# Patient Record
Sex: MALE | Race: BLACK OR AFRICAN AMERICAN | NOT HISPANIC OR LATINO | URBAN - METROPOLITAN AREA
[De-identification: names, ages, dates, MRNs, and addresses within clinical notes are randomized per-mention and may not be internally consistent; named-entity substitution may affect disease eponyms.]

---

## 2019-11-21 ENCOUNTER — HISTORICAL (OUTPATIENT)
Dept: LAB | Facility: HOSPITAL | Age: 39
End: 2019-11-21

## 2020-12-08 ENCOUNTER — HISTORICAL (OUTPATIENT)
Dept: ADMINISTRATIVE | Facility: HOSPITAL | Age: 40
End: 2020-12-08

## 2020-12-08 LAB
ABS NEUT (OLG): 3.1 X10(3)/MCL (ref 2.1–9.2)
ALBUMIN SERPL-MCNC: 4.4 GM/DL (ref 3.4–5)
ALBUMIN/GLOB SERPL: 1.47 {RATIO} (ref 1.5–2.5)
ALP SERPL-CCNC: 80 UNIT/L (ref 38–126)
ALT SERPL-CCNC: 20 UNIT/L (ref 7–52)
APPEARANCE, UA: CLEAR
AST SERPL-CCNC: 22 UNIT/L (ref 15–37)
BACTERIA #/AREA URNS AUTO: ABNORMAL /HPF
BILIRUB SERPL-MCNC: 1.1 MG/DL (ref 0.2–1)
BILIRUB UR QL STRIP: NEGATIVE MG/DL
BILIRUBIN DIRECT+TOT PNL SERPL-MCNC: 0.2 MG/DL (ref 0–0.5)
BILIRUBIN DIRECT+TOT PNL SERPL-MCNC: 0.9 MG/DL
BUN SERPL-MCNC: 6 MG/DL (ref 7–18)
CALCIUM SERPL-MCNC: 9.1 MG/DL (ref 8.5–10.1)
CHLORIDE SERPL-SCNC: 105 MMOL/L (ref 98–107)
CHOLEST SERPL-MCNC: 113 MG/DL (ref 0–200)
CHOLEST/HDLC SERPL: 3 {RATIO}
CO2 SERPL-SCNC: 27 MMOL/L (ref 21–32)
COLOR UR: YELLOW
CREAT SERPL-MCNC: 0.75 MG/DL (ref 0.6–1.3)
ERYTHROCYTE [DISTWIDTH] IN BLOOD BY AUTOMATED COUNT: 12.6 % (ref 11.5–17)
GLOBULIN SER-MCNC: 3 GM/DL (ref 1.2–3)
GLUCOSE (UA): NEGATIVE MG/DL
GLUCOSE SERPL-MCNC: 129 MG/DL (ref 74–106)
HCT VFR BLD AUTO: 42.8 % (ref 42–52)
HDLC SERPL-MCNC: 38 MG/DL (ref 35–60)
HGB BLD-MCNC: 14.3 GM/DL (ref 14–18)
HGB UR QL STRIP: ABNORMAL UNIT/L
KETONES UR QL STRIP: NEGATIVE MG/DL
LDLC SERPL CALC-MCNC: 74 MG/DL (ref 0–129)
LEUKOCYTE ESTERASE UR QL STRIP: NEGATIVE UNIT/L
LYMPHOCYTES # BLD AUTO: 2.3 X10(3)/MCL (ref 0.6–3.4)
LYMPHOCYTES NFR BLD AUTO: 39 % (ref 13–40)
MCH RBC QN AUTO: 29.1 PG (ref 27–31.2)
MCHC RBC AUTO-ENTMCNC: 33 GM/DL (ref 32–36)
MCV RBC AUTO: 87 FL (ref 80–94)
MONOCYTES # BLD AUTO: 0.5 X10(3)/MCL (ref 0.1–1.3)
MONOCYTES NFR BLD AUTO: 9.2 % (ref 0.1–24)
NEUTROPHILS NFR BLD AUTO: 51.8 % (ref 47–80)
NITRITE UR QL STRIP.AUTO: NEGATIVE
PH UR STRIP: 6 [PH]
PLATELET # BLD AUTO: 258 X10(3)/MCL (ref 130–400)
PMV BLD AUTO: 9.5 FL (ref 9.4–12.4)
POTASSIUM SERPL-SCNC: 3.8 MMOL/L (ref 3.5–5.1)
PROT SERPL-MCNC: 7.4 GM/DL (ref 6.4–8.2)
PROT UR QL STRIP: NEGATIVE MG/DL
RBC # BLD AUTO: 4.92 X10(6)/MCL (ref 4.7–6.1)
RBC #/AREA URNS HPF: ABNORMAL /HPF
SODIUM SERPL-SCNC: 140 MMOL/L (ref 136–145)
SP GR UR STRIP: 1.02
SQUAMOUS EPITHELIAL, UA: ABNORMAL /LPF
TRIGL SERPL-MCNC: 73 MG/DL (ref 30–150)
UROBILINOGEN UR STRIP-ACNC: 0.2 MG/DL
VLDLC SERPL CALC-MCNC: 14.6 MG/DL
WBC # SPEC AUTO: 5.9 X10(3)/MCL (ref 4.5–11.5)
WBC #/AREA URNS AUTO: ABNORMAL /[HPF]

## 2020-12-09 LAB
EST. AVERAGE GLUCOSE BLD GHB EST-MCNC: 134 MG/DL
HBA1C MFR BLD: 6.3 % (ref 4.4–6.4)

## 2021-03-15 ENCOUNTER — HISTORICAL (OUTPATIENT)
Dept: ADMINISTRATIVE | Facility: HOSPITAL | Age: 41
End: 2021-03-15

## 2021-03-15 LAB
EST. AVERAGE GLUCOSE BLD GHB EST-MCNC: 137 MG/DL
HBA1C MFR BLD: 6.4 % (ref 4.4–6.4)

## 2021-12-14 ENCOUNTER — HISTORICAL (OUTPATIENT)
Dept: ADMINISTRATIVE | Facility: HOSPITAL | Age: 41
End: 2021-12-14

## 2021-12-14 LAB
ABS NEUT (OLG): 3.7 X10(3)/MCL (ref 2.1–9.2)
ALBUMIN SERPL-MCNC: 4.6 GM/DL (ref 3.4–5)
ALBUMIN/GLOB SERPL: 1.64 {RATIO} (ref 1.5–2.5)
ALP SERPL-CCNC: 70 UNIT/L (ref 38–126)
ALT SERPL-CCNC: 31 UNIT/L (ref 7–52)
APPEARANCE, UA: CLEAR
AST SERPL-CCNC: 21 UNIT/L (ref 15–37)
BACTERIA #/AREA URNS AUTO: ABNORMAL /HPF
BILIRUB SERPL-MCNC: 1.2 MG/DL (ref 0.2–1)
BILIRUB UR QL STRIP: NEGATIVE MG/DL
BILIRUBIN DIRECT+TOT PNL SERPL-MCNC: 0.3 MG/DL (ref 0–0.5)
BILIRUBIN DIRECT+TOT PNL SERPL-MCNC: 0.9 MG/DL
BUN SERPL-MCNC: 9 MG/DL (ref 7–18)
CALCIUM SERPL-MCNC: 9.6 MG/DL (ref 8.5–10.1)
CHLORIDE SERPL-SCNC: 102 MMOL/L (ref 98–107)
CHOLEST SERPL-MCNC: 125 MG/DL (ref 0–200)
CHOLEST/HDLC SERPL: 3.3 {RATIO}
CO2 SERPL-SCNC: 29 MMOL/L (ref 21–32)
COLOR UR: YELLOW
CREAT SERPL-MCNC: 0.83 MG/DL (ref 0.6–1.3)
ERYTHROCYTE [DISTWIDTH] IN BLOOD BY AUTOMATED COUNT: 13.4 % (ref 11.5–17)
EST CREAT CLEARANCE SER (OHS): 165.29 ML/MIN
EST. AVERAGE GLUCOSE BLD GHB EST-MCNC: 143 MG/DL
GLOBULIN SER-MCNC: 2.8 GM/DL (ref 1.2–3)
GLUCOSE (UA): NEGATIVE MG/DL
GLUCOSE SERPL-MCNC: 157 MG/DL (ref 74–106)
HBA1C MFR BLD: 6.6 % (ref 4.4–6.4)
HCT VFR BLD AUTO: 44.8 % (ref 42–52)
HDLC SERPL-MCNC: 38 MG/DL (ref 35–60)
HGB BLD-MCNC: 15 GM/DL (ref 14–18)
HGB UR QL STRIP: ABNORMAL UNIT/L
KETONES UR QL STRIP: NEGATIVE MG/DL
LDLC SERPL CALC-MCNC: 70 MG/DL (ref 0–129)
LEUKOCYTE ESTERASE UR QL STRIP: NEGATIVE UNIT/L
LYMPHOCYTES # BLD AUTO: 1.9 X10(3)/MCL (ref 0.6–3.4)
LYMPHOCYTES NFR BLD AUTO: 30.5 % (ref 13–40)
MCH RBC QN AUTO: 28.3 PG (ref 27–31.2)
MCHC RBC AUTO-ENTMCNC: 34 GM/DL (ref 32–36)
MCV RBC AUTO: 84 FL (ref 80–94)
MONOCYTES # BLD AUTO: 0.7 X10(3)/MCL (ref 0.1–1.3)
MONOCYTES NFR BLD AUTO: 11.4 % (ref 0.1–24)
NEUTROPHILS NFR BLD AUTO: 58.1 % (ref 47–80)
NITRITE UR QL STRIP.AUTO: NEGATIVE
PH UR STRIP: 6 [PH]
PLATELET # BLD AUTO: 284 X10(3)/MCL (ref 130–400)
PMV BLD AUTO: 9.2 FL (ref 9.4–12.4)
POTASSIUM SERPL-SCNC: 3.4 MMOL/L (ref 3.5–5.1)
PROT SERPL-MCNC: 7.4 GM/DL (ref 6.4–8.2)
PROT UR QL STRIP: NEGATIVE MG/DL
RBC # BLD AUTO: 5.3 X10(6)/MCL (ref 4.7–6.1)
RBC #/AREA URNS HPF: ABNORMAL /HPF
SODIUM SERPL-SCNC: 141 MMOL/L (ref 136–145)
SP GR UR STRIP: 1.02
SQUAMOUS EPITHELIAL, UA: ABNORMAL /LPF
TRIGL SERPL-MCNC: 115 MG/DL (ref 30–150)
UROBILINOGEN UR STRIP-ACNC: 0.2 MG/DL
VLDLC SERPL CALC-MCNC: 23 MG/DL
WBC # SPEC AUTO: 6.3 X10(3)/MCL (ref 4.5–11.5)
WBC #/AREA URNS AUTO: ABNORMAL /[HPF]

## 2022-04-07 ENCOUNTER — HISTORICAL (OUTPATIENT)
Dept: ADMINISTRATIVE | Facility: HOSPITAL | Age: 42
End: 2022-04-07

## 2022-04-24 VITALS
OXYGEN SATURATION: 97 % | WEIGHT: 215.63 LBS | SYSTOLIC BLOOD PRESSURE: 120 MMHG | BODY MASS INDEX: 30.87 KG/M2 | HEIGHT: 70 IN | DIASTOLIC BLOOD PRESSURE: 78 MMHG

## 2022-05-01 NOTE — HISTORICAL [facility]
This is a historical note converted from [facility]. Formatting and pictures may have been removed.  Please reference [facility] for original formatting and attached multimedia. Chief Complaint  Annual wellness physical- NPO  History of Present Illness  Pt presents for Wellness exam.  He has been feeling okay.  He sleeps pretty good.  Appetite is normal.   at [facility].  He has alcohol once to twice a week.  He does not smoke.  He has an occasional soft drink.  He is physically active at work.  He is single.  Review of Systems  Constitutional:?no?weight gain,?no?weight loss,?no?fatigue, ?no?fever, ?no?chills, ?no?weakness, ?no?trouble sleeping  Eyes: ?no?vision loss/changes,?+ contacts and glasses,??no?pain,??no?redness,??no?blurry or double vision,??no?flashing lights,??no?glaucoma,??no?cataracts  Last eye exam:? about 1 year  Neck: ?no?lymphadenopathy,??no?thyroid abnormalities,??no?bruits,??no?stiffness  Ears:?no?decreased hearing,??no?tinnitus,??no?earache,??no?drainage?  Nose:??no congestion,??no?rhinorrhea,??no?epistaxis,??no?sinus pressure  Throat/Oral:?no?sore throat,??no?hoarseness, ?no?dental caries,??no?gum bleeding,??no?oral lesions  Cardiovascular:?no?chest pain, palpitations, or tightness,?no?dyspnea with exertion,??no?orthopnea,??no?paroxysmal nocturnal dyspnea  Respiratory:? nocough,? no?sputum,??no?hemoptysis,??no?dyspnea,??no?wheezing,??no?pleuritic chest pain?  Gastrointestinal:?no?abdominal pain,?no?nausea,?no?vomiting,??no?heartburn,??no?dysphagia or odynophagia,??no?diarrhea,??no?constipation,??no?melena,??no?hematochezia,?no?jaundice  Urinary:?no?frequency,??no?urgency,??no?burning or pain,?no?hematuria,??no?incontinence,??no?hesitancy,??no?incomplete voiding,??no?flank pain,??no?nocturia  Musculoskeletal:?no?myalgias,?no?arthralgias,?no?neck pain,??no?back pain,??no?swelling of extremities  Skin:?no?rashes,?no?sores,?no?non-healing wounds  Neurologic:? no?headaches:  infrequent, has one presently,?no?dizziness/lightheadedness,?no?tremors,?no?paresthesias,??no?seizures,??no?muscle weakness  Psychiatric:?no?depression/sadness,?no?anhedonia,?no?irritability,?no?suicidal ideations,?no?anxiety,?no?panic attacks  Endocrine:?no?hot or cold intolerance,?no?sweating,?no?polyuria,?no?polydipsia,?no?polyphagia  Hematologic:?no?bruising,??no?bleeding disorders?  Physical Exam  Vitals & Measurements  T:?36.8? ?C (Oral)? HR:?69(Peripheral)? BP:?90/60? SpO2:?98%?  HT:?177.00?cm? WT:?99.600?kg? BMI:?31.79?  ?  GENERAL: The patient is a well-developed, well-nourished male in no?apparent distress. He is alert and oriented x 4.  HEENT: Head is normocephalic and atraumatic. Extraocular muscles are intact. Pupils are equal, round, and reactive to light and accommodation. Nares appeared normal. Mouth is well hydrated and without lesions. Mucous membranes are moist. Posterior pharynx clear of any exudate or lesions.  NECK: Supple. No carotid bruits.? No lymphadenopathy or thyromegaly.  LUNGS: Clear to auscultation.  HEART: Regular rate and rhythm without murmur, gallops or rubs.  ABDOMEN: Soft, nontender, and nondistended.? Positive bowel sounds.? No hepatosplenomegaly was noted.  EXTREMITIES: Without any cyanosis, clubbing, rash, lesions or edema.  NEUROLOGIC: Cranial nerves II through XII are grossly intact.? No motor or sensory deficits.? Cerebellar function intact.  SKIN: No ulceration or induration present.  :? Normal male genitalia, no hernias,  ?  Assessment/Plan  1.?Wellness examination?Z00.00  Patient?presents for wellness examination.  He has been doing well.? His blood pressures been well controlled.  He reports some anxiety at times.  Tdap 0.5 IM administered.  Patient declines a flu shot today.  Patient encouraged to lose weight.  Labs pending.  Ordered:  Automated Diff, Routine collect, 12/08/20 9:55:00 CST, Blood, Collected, Stop date 12/08/20 9:55:00 CST, Lab Collect, Wellness  examination  Hypertension, 12/08/20 9:55:00 CST  CBC w/ Auto Diff, Routine collect, 12/08/20 9:55:00 CST, Blood, Stop date 12/08/20 9:55:00 CST, Lab Collect, Wellness examination  Hypertension, 12/08/20 9:55:00 CST  Clinic Follow-Up Wellness, *Est. 12/08/21 3:00:00 CST, Order for future visit, Wellness examination, [facility]  Comprehensive Metabolic Panel, Routine collect, 12/08/20 9:55:00 CST, Blood, Stop date 12/08/20 9:55:00 CST, Lab Collect, Wellness examination  Hypertension, 12/08/20 9:55:00 CST  Lipid Panel, Routine collect, 12/08/20 9:55:00 CST, Blood, Stop date 12/08/20 9:55:00 CST, Lab Collect, Wellness examination  Hypercholesteremia  Hypertension, 12/08/20 9:55:00 CST  Preventative Health Care Est 18-39 years [id number] PC, Wellness examination  Hypertension  Hypercholesteremia  Immunization due, [facility] - [facility], 12/08/20 9:54:00 CST  Urinalysis no Reflex, Routine collect, Urine, 12/08/20 9:55:00 CST, Stop date 12/08/20 9:55:00 CST, Nurse collect, Wellness examination  Hypertension  ?  2.?Hypertension?I10  ?Well-controlled.  I reconciled his medication sheet so that?he is taking amlodipine once a day. ?Continue olmesartan once a day.  Continue carvedilol twice a day.  Ordered:  Automated Diff, Routine collect, 12/08/20 9:55:00 CST, Blood, Collected, Stop date 12/08/20 9:55:00 CST, Lab Collect, Wellness examination  Hypertension, 12/08/20 9:55:00 CST  CBC w/ Auto Diff, Routine collect, 12/08/20 9:55:00 CST, Blood, Stop date 12/08/20 9:55:00 CST, Lab Collect, Wellness examination  Hypertension, 12/08/20 9:55:00 CST  Clinic Follow up, *Est. 06/08/21 3:00:00 CDT, Order for future visit, Hypertension, [facility]  Comprehensive Metabolic Panel, Routine collect, 12/08/20 9:55:00 CST, Blood, Stop date 12/08/20 9:55:00 CST, Lab Collect, Wellness examination  Hypertension, 12/08/20 9:55:00 CST  Lipid Panel, Routine collect, 12/08/20 9:55:00 CST, Blood, Stop date 12/08/20 9:55:00 CST, Lab Collect, Wellness  examination  Hypercholesteremia  Hypertension, 12/08/20 9:55:00 CST  Replaced by [facility] Est 18-39 years [id number] PC, Wellness examination  Hypertension  Hypercholesteremia  Immunization due, [facility] - [facility], 12/08/20 9:54:00 CST  Urinalysis no Reflex, Routine collect, Urine, 12/08/20 9:55:00 CST, Stop date 12/08/20 9:55:00 CST, Nurse collect, Wellness examination  Hypertension  ?  3.?Hypercholesteremia?E78.00  Patient has been compliant with medication; refills sent.  Lipid profile pending.  Ordered:  Lipid Panel, Routine collect, 12/08/20 9:55:00 CST, Blood, Stop date 12/08/20 9:55:00 CST, Lab Collect, Wellness examination  Hypercholesteremia  Hypertension, 12/08/20 9:55:00 CST  Replaced by [facility] Est 18-39 years [id number] PC, Wellness examination  Hypertension  Hypercholesteremia  Immunization due, [facility] - AFP, 12/08/20 9:54:00 CST  ?  4.?Immunization refused?Z28.21  Patient?declines a flu shot at this time; benefits/potential risks/side effects discussed with patient.  ?  ?  5.?Immunization due?Z23  Tdap 0.5 IM administered; benefits/potential risk/side effects discussed with patient.  Ordered:  tetanus/diphth/pertuss (Tdap) adult/adol, 0.5 mL, IM, Once-Unscheduled, first dose 12/08/20 9:50:00 CST  Replaced by [facility] Est 18-39 years [id number] PC, Wellness examination  Hypertension  Hypercholesteremia  Immunization due, [facility] - AFP, 12/08/20 9:54:00 CST  ?  Orders:  alPRAzolam, See Instructions, 1/2 to 1 tablet once to twice a day as needed for anxiety., # 15 tab(s), 0 Refill(s), Pharmacy: [facility] #[id number], 177, cm, Height/Length Dosing, 12/08/20 9:28:00 CST, 99.6, kg, Weight Dosing, 12/08/20 9:28:00 CST  amLODIPine, 5 mg = 1 tab(s), Oral, Daily, # 30 tab(s), 11 Refill(s), Pharmacy: [facility] #[id number], 177, cm, Height/Length Dosing, 12/08/20 9:28:00 CST, 99.6, kg, Weight Dosing, 12/08/20 9:28:00 CST  carvedilol, 12.5 mg = 1 tab(s), Oral, BID, # 60 tab(s), 11  Refill(s), Pharmacy: [facility] ST[facility] #[id number], 177, cm, Height/Length Dosing, 12/08/20 9:28:00 CST, 99.6, kg, Weight Dosing, 12/08/20 9:28:00 CST  olmesartan, See Instructions, TAKE 1 TABLET BY MOUTH DAILY, # 30 tab(s), 11 Refill(s), Pharmacy: [facility] STORE #[id number], 177, cm, Height/Length Dosing, 12/08/20 9:28:00 CST, 99.6, kg, Weight Dosing, 12/08/20 9:28:00 CST  rosuvastatin, 40 mg = 1 tab(s), Oral, Daily, # 30 tab(s), 11 Refill(s), Pharmacy: [facility] STORE #[id number], 177, cm, Height/Length Dosing, 12/08/20 9:28:00 CST, 99.6, kg, Weight Dosing, 12/08/20 9:28:00 CST  Patient reports some occasional anxiety at times.? Work can be stressful and he has problems with relaxing after work.  He would like to try something on as-needed basis to calm his nerves.  We will?start him on a trial of alprazolam 0.5 mg: Take 1/2 to 1 tablet once to?twice a day as needed for anxiety.  Referrals  Clinic Follow up, *Est. 06/08/21 3:00:00 CDT, Order for future visit, Hypertension, [facility]  Clinic Follow-Up Wellness, *Est. 12/08/21 3:00:00 CST, Order for future visit, Wellness examination, [facility]   Problem List/Past Medical History  Ongoing  Hypercholesteremia  Hypertension  Obesity  Historical  No qualifying data  Procedure/Surgical History  None   Medications  amlodipine 5 mg oral tablet, 5 mg= 1 tab(s), Oral, Daily, 11 refills  carvedilol 12.5 mg oral tablet, 12.5 mg= 1 tab(s), Oral, BID, 11 refills  olmesartan 40 mg oral tablet, See Instructions, 11 refills  rosuvastatin 40 mg oral tablet, 40 mg= 1 tab(s), Oral, Daily, 11 refills  tetanus/diphth/pertuss (Tdap) adult/adol 5 units-2.5 units-18.5 mcg/0.5 mL intramuscular suspension, 0.5 mL, IM, Once-Unscheduled  Xanax 0.5 mg oral tablet, See Instructions  Allergies  No Known Allergies  Social History  Abuse/Neglect  No, 06/08/2020  Alcohol  Current, Beer, Wine, Liquor, 1-2 times per week, Alcohol use interferes with work or home: No. Others hurt by drinking: No.  Household alcohol concerns: No., 11/21/2019  Employment/School  Employed, Work/School description: [employer]. Highest education level: High school., 11/21/2019  Exercise  Home/Environment  Lives with ROOM MATE. Living situation: Home/Independent., 11/21/2019  Nutrition/Health  Regular, Good, 12/08/2020  Spiritual/Cultural  None, 11/21/2019  Substance Use  Never, 11/21/2019  Tobacco  Never (less than 100 in lifetime), N/A, 06/08/2020  Family History  Hypertension: Father.  Health Maintenance  Health Maintenance  ???Pending?(in the next year)  ??? ??OverDue  ??? ? ? ?Alcohol Misuse Screening due??01/02/20??and every 1??year(s)  ??? ? ? ?Influenza Vaccine due??10/01/20??and every 1??day(s)  ??? ? ? ?Hypertension Management-BMP due??11/25/20??and every 1??year(s)  ??? ??Due?  ??? ? ? ?ADL Screening due??12/08/20??and every 1??year(s)  ??? ? ? ?Depression Screening due??12/08/20??Unknown Frequency  ??? ? ? ?Hypertension Management-Education due??12/08/20??and every 1??year(s)  ??? ? ? ?Tetanus Vaccine due??12/08/20??and every 10??year(s)  ??? ??Due In Future?  ??? ? ? ?Obesity Screening not due until??01/01/21??and every 1??year(s)  ???Satisfied?(in the past 1 year)  ??? ??Satisfied?  ??? ? ? ?Blood Pressure Screening on??12/08/20.??Satisfied by Simona Ramos LPN  ??? ? ? ?Body Mass Index Check on??12/08/20.??Satisfied by Simona Ramos LPN  ??? ? ? ?Hypertension Management-Blood Pressure on??12/08/20.??Satisfied by Simona Ramos LPN  ??? ? ? ?Influenza Vaccine on??12/08/20.??Satisfied by Simona Ramos LPN  ??? ? ? ?Obesity Screening on??12/08/20.??Satisfied by Simona Ramos LPN  ?

## 2022-05-01 NOTE — HISTORICAL [facility]ERNER
This is a historical note converted from [facility]. Formatting and pictures may have been removed.  Please reference [facility] for original formatting and attached multimedia. Chief Complaint  Annual wellness physical- NPO  History of Present Illness  Pt presents for Wellness exam.  He has been feeling okay.  He sleeps pretty good.  Appetite is normal.   at [facility].  He has alcohol once to twice a week.  He does not smoke.  He has an occasional soft drink. He has a cup of coffee a day.  He is physically active at work.  He is single.  Review of Systems  Constitutional:?no?weight gain,?no?weight loss,?no?fatigue, ?no?fever, ?no?chills, ?no?weakness, ?no?trouble sleeping  Eyes: ?no?vision loss/changes,?+ contacts and glasses,??no?pain,??no?redness,??no?blurry or double vision,??no?flashing lights,??no?glaucoma,??no?cataracts  Last eye exam:? about?1.5?years ago  Neck: ?no?lymphadenopathy,??no?thyroid abnormalities,??no?bruits,??no?stiffness  Ears:?no?decreased hearing,??no?tinnitus,??no?earache,??no?drainage?  Nose:??no congestion,??no?rhinorrhea,??no?epistaxis,??no?sinus pressure  Throat/Oral:?no?sore throat,??no?hoarseness, ?no?dental caries,??no?gum bleeding,??no?oral lesions  Cardiovascular:?no?chest pain, palpitations, or tightness,?no?dyspnea with exertion,??no?orthopnea,??no?paroxysmal nocturnal dyspnea, + hypertension, + hypercholesteremia  Respiratory:? no cough,? no?sputum,??no?hemoptysis,??no?dyspnea,??no?wheezing,??no?pleuritic chest pain?  Gastrointestinal:?no?abdominal pain,?no?nausea,?no?vomiting,??no?heartburn,??no?dysphagia or odynophagia,??no?diarrhea,??no?constipation,??no?melena,??no?hematochezia,?no?jaundice, no family history of colon cancer  Urinary:?no?frequency,??no?urgency,??no?burning or pain,?no?hematuria,??no?incontinence,??no?hesitancy,??no?incomplete voiding,??no?flank pain,??no?nocturia  Musculoskeletal:?no?myalgias,?no?arthralgias,?no?neck pain,??no?back  pain,??no?swelling of extremities  Skin:?no?rashes,?no?sores,?no?non-healing wounds  Neurologic:??no?headaches, has one presently,?no?dizziness/lightheadedness,?no?tremors,?no?paresthesias,??no?seizures,??no?muscle weakness  Psychiatric:?no?depression/sadness,?no?anhedonia,?no?irritability,?no?suicidal ideations,?no?anxiety,?no?panic attacks  Endocrine:?no?hot or cold intolerance,?no?sweating,?no?polyuria,?no?polydipsia,?no?polyphagia, + hyperglycemia  Hematologic:?no?bruising,??no?bleeding disorders?  Physical Exam  Vitals & Measurements  T:?36.7? ?C (Oral)? HR:?80(Peripheral)? BP:?120/78? SpO2:?97%?  HT:?177.00?cm? WT:?97.800?kg? BMI:?31.22?  ?  GENERAL: The patient is a well-developed, well-nourished obese  male in no?apparent distress. He is alert and oriented x 4.  HEENT: Head is normocephalic and atraumatic. Extraocular muscles are intact. Pupils are equal, round, and reactive to light and accommodation. Nares appeared normal. Mouth is well hydrated and without lesions. Mucous membranes are moist. Posterior pharynx clear of any exudate or lesions.  NECK: Supple. No carotid bruits.? No lymphadenopathy or thyromegaly.  LUNGS: Clear to auscultation.  HEART: Regular rate and rhythm without murmur, gallops or rubs.  ABDOMEN: Soft, nontender,?obese, and nondistended.? Positive bowel sounds.? No hepatosplenomegaly was noted.  EXTREMITIES: Without any cyanosis, clubbing, rash, lesions or edema.  NEUROLOGIC: Cranial nerves II through XII are grossly intact.? No motor or sensory deficits.? Cerebellar function intact.  SKIN: No ulceration or induration present.  :? Normal male genitalia, no hernias,?testes descended with normal size consistency.  ?  Assessment/Plan  1.?Wellness examination?Z00.00  Patient presents for wellness examination.?  He has been doing well.  His blood pressure is well controlled.  Patient encouraged to lose weight.  Patient declines a flu shot today.  His EKG?reveals normal  sinus rhythm with nonspecific T wave changes?in his inferior leads; his?EKG is unchanged?from 2019.  Labs pending.  Ordered:  CBC w/ Auto Diff, Routine collect, 12/14/21 9:29:00 CST, Blood, Order for future visit, Stop date 12/14/21 9:29:00 CST, Lab Collect, Wellness examination  Hypertension, 12/14/21 9:29:00 CST  Clinic Follow-Up Wellness, *Est. 12/14/22 3:00:00 CST, Order for future visit, Wellness examination, [facility] AFP  Comprehensive Metabolic Panel, Routine collect, 12/14/21 9:29:00 CST, Blood, Order for future visit, Stop date 12/14/21 9:29:00 CST, Lab Collect, Wellness examination  Hypertension  Hyperglycemia, 12/14/21 9:29:00 CST  Lab Collection Request, 12/14/21 9:29:00 CST, [facility] AMB - AFP, 12/14/21 9:29:00 CST, Wellness examination  Hypertension  Hypercholesteremia  Hyperglycemia  Lipid Panel, Routine collect, 12/14/21 9:29:00 CST, Blood, Order for future visit, Stop date 12/14/21 9:29:00 CST, Lab Collect, Wellness examination  Hypercholesteremia  Hypertension, 12/14/21 9:29:00 CST  Preventative Health Care Est 18-39 years [id number] PC, Wellness examination  Hypertension  Hypercholesteremia  Hyperglycemia  Obesity  Influenza vaccine refused, [facility] AMB - AFP, 12/14/21 9:29:00 CST  Urinalysis no Reflex, Routine collect, Urine, Order for future visit, 12/14/21 9:29:00 CST, Stop date 12/14/21 9:29:00 CST, Nurse collect, Wellness examination  Hypertension  ?  2.?Hypertension?I10  ?Well-controlled; continue current medications.?Refills sent.  Ordered:  CBC w/ Auto Diff, Routine collect, 12/14/21 9:29:00 CST, Blood, Order for future visit, Stop date 12/14/21 9:29:00 CST, Lab Collect, Wellness examination  Hypertension, 12/14/21 9:29:00 CST  Comprehensive Metabolic Panel, Routine collect, 12/14/21 9:29:00 CST, Blood, Order for future visit, Stop date 12/14/21 9:29:00 CST, Lab Collect, Wellness examination  Hypertension  Hyperglycemia, 12/14/21 9:29:00 CST  Lab Collection Request, 12/14/21 9:29:00  CST, [facility]LINK AMB - AFP, 12/14/21 9:29:00 CST, Wellness examination  Hypertension  Hypercholesteremia  Hyperglycemia  Lipid Panel, Routine collect, 12/14/21 9:29:00 CST, Blood, Order for future visit, Stop date 12/14/21 9:29:00 CST, Lab Collect, Wellness examination  Hypercholesteremia  Hypertension, 12/14/21 9:29:00 CST  [facility] Est 18-39 years [id number] PC, Wellness examination  Hypertension  Hypercholesteremia  Hyperglycemia  Obesity  Influenza vaccine refused, [facility] AMB - AFP, 12/14/21 9:29:00 CST  Urinalysis no Reflex, Routine collect, Urine, Order for future visit, 12/14/21 9:29:00 CST, Stop date 12/14/21 9:29:00 CST, Nurse collect, Wellness examination  Hypertension  ?  3.?Hypercholesteremia?E78.00  ?Patient has been compliant with medication; refills sent.  Continue low-cholesterol diet.  Labs pending.  Ordered:  Lab Collection Request, 12/14/21 9:29:00 CST, [facility]LINK AMB - AFP, 12/14/21 9:29:00 CST, Wellness examination  Hypertension  Hypercholesteremia  Hyperglycemia  Lipid Panel, Routine collect, 12/14/21 9:29:00 CST, Blood, Order for future visit, Stop date 12/14/21 9:29:00 CST, Lab Collect, Wellness examination  Hypercholesteremia  Hypertension, 12/14/21 9:29:00 CST  [facility] Est 18-39 years [id number] PC, Wellness examination  Hypertension  Hypercholesteremia  Hyperglycemia  Obesity  Influenza vaccine refused, [facility] - AFP, 12/14/21 9:29:00 CST  ?  4.?Hyperglycemia?R73.9  ?A1c pending.  Limit intake of sugary foods and starches.  Patient encouraged to lose weight.  Ordered:  Comprehensive Metabolic Panel, Routine collect, 12/14/21 9:29:00 CST, Blood, Order for future visit, Stop date 12/14/21 9:29:00 CST, Lab Collect, Wellness examination  Hypertension  Hyperglycemia, 12/14/21 9:29:00 CST  Hemoglobin A1c, Routine collect, 12/14/21 9:29:00 CST, Blood, Order for future visit, Stop date 12/14/21 9:29:00 CST, Lab Collect, Hyperglycemia, 12/14/21 9:29:00 CST  Lab  Collection Request, 12/14/21 9:29:00 CST, INK AMB - AFP, 12/14/21 9:29:00 CST, Wellness examination  Hypertension  Hypercholesteremia  Hyperglycemia  [facility] Care Est 18-39 years [id number] PC, Wellness examination  Hypertension  Hypercholesteremia  Hyperglycemia  Obesity  Influenza vaccine refused, INK AMB - AFP, 12/14/21 9:29:00 CST  ?  5.?Obesity?E66.9  ?Patient encouraged to?make healthier food choices?and to limit intake of?processed foods, fried foods and sugary foods.  Patient encouraged to lose weight.  Ordered:  [facility] Est 18-39 years [id number] PC, Wellness examination  Hypertension  Hypercholesteremia  Hyperglycemia  Obesity  Influenza vaccine refused, INK AMB - AFP, 12/14/21 9:29:00 CST  ?  6.?Influenza vaccine refused?Z28.21  ?Patient declines a flu shot at this time;?benefits/potential risks/side effects discussed with patient.  Ordered:  [facility] 18-39 years [id number] PC, Wellness examination  Hypertension  Hypercholesteremia  Hyperglycemia  Obesity  Influenza vaccine refused, [facility]K AMB - AFP, 12/14/21 9:29:00 CST  ?  Orders:  amLODIPine, 5 mg = 1 tab(s), Oral, Daily, # 30 tab(s), 11 Refill(s), Pharmacy: [id number], 177, cm, Height/Length Dosing, 12/14/21 9:00:00 CST, 97.8, kg, Weight Dosing, 12/14/21 9:00:00 CST  carvedilol, See Instructions, TAKE 1 TABLET BY MOUTH TWICE DAILY, # 60 tab(s), 11 Refill(s), Pharmacy: [facility] #[id number], 177, cm, Height/Length Dosing, 12/14/21 9:00:00 CST, 97.8, kg, Weight Dosing, 12/14/21 9:00:00 CST  olmesartan, See Instructions, TAKE 1 TABLET BY MOUTH DAILY, # 30 tab(s), 11 Refill(s), Pharmacy: [facility] #[id number], 177, cm, Height/Length Dosing, 12/14/21 9:00:00 CST, 97.8, kg, Weight Dosing, 12/14/21 9:00:00 CST  rosuvastatin, 40 mg = 1 tab(s), Oral, Daily, # 30 tab(s), 11 Refill(s), Pharmacy: [facility] #[id number], 177, cm, Height/Length Dosing, 12/14/21 9:00:00 CST,  97.8, kg, Weight Dosing, 12/14/21 9:00:00 CST  Referrals  Clinic Follow-Up Wellness, *Est. 12/14/22 3:00:00 CST, Order for future visit, Wellness examination, ink AFP   Problem List/Past Medical History  Ongoing  Hypercholesteremia  Hyperglycemia  Hypertension  Obesity  Historical  No qualifying data  Procedure/Surgical History  None   Medications  amlodipine 5 mg oral tablet, 5 mg= 1 tab(s), Oral, Daily, 11 refills  carvedilol 12.5 mg oral tablet, See Instructions, 11 refills  olmesartan 40 mg oral tablet, See Instructions, 11 refills  rosuvastatin 40 mg oral tablet, 40 mg= 1 tab(s), Oral, Daily, 11 refills  Xanax 0.5 mg oral tablet, See Instructions  Allergies  No Known Allergies  Social History  Abuse/Neglect  No, 06/09/2021  No, 06/08/2020  Alcohol  Current, Beer, Wine, Liquor, 1-2 times per week, Alcohol use interferes with work or home: No. Others hurt by drinking: No. Household alcohol concerns: No., 11/21/2019  Employment/School  Employed, Work/School description: [employer]. Highest education level: High school., 11/21/2019  Exercise  Home/Environment  Lives with ROOM MATE. Living situation: Home/Independent., 11/21/2019  Nutrition/Health  Regular, Good, 12/08/2020  Spiritual/Cultural  None, 11/21/2019  Substance Use  Never, 11/21/2019  Tobacco  Never (less than 100 in lifetime), N/A, 06/09/2021  Never (less than 100 in lifetime), N/A, 06/08/2020  Family History  Hypertension: Father.  Immunizations  Vaccine Date Status   tetanus/diphtheria/pertussis, acel(Tdap) 12/08/2020 Given   Health Maintenance  Health Maintenance  ???Pending?(in the next year)  ??? ??OverDue  ??? ? ? ?Alcohol Misuse Screening due??01/02/21??and every 1??year(s)  ??? ??Due?  ??? ? ? ?ADL Screening due??12/14/21??and every 1??year(s)  ??? ? ? ?Depression Screening due??12/14/21??Unknown Frequency  ??? ? ? ?Hypertension Management-Education due??12/14/21??and every 1??year(s)  ??? ??Due In Future?  ??? ? ? ?Obesity Screening not due  until??01/01/22??and every 1??year(s)  ??? ? ? ?Diabetes Screening not due until??06/09/22??and every 1??year(s)  ??? ? ? ?Hypertension Management-BMP not due until??06/09/22??and every 1??year(s)  ???Satisfied?(in the past 1 year)  ??? ??Satisfied?  ??? ? ? ?Blood Pressure Screening on??12/14/21.??Satisfied by Simona Ramos LPN  ??? ? ? ?Body Mass Index Check on??12/14/21.??Satisfied by Simona Ramos LPN  ??? ? ? ?Diabetes Screening on??06/09/21.??Satisfied by Kaleb Lomas  ??? ? ? ?Hypertension Management-Blood Pressure on??12/14/21.??Satisfied by Simona Ramos LPN  ??? ? ? ?Influenza Vaccine on??12/14/21.??Satisfied by Simona Ramos LPN  ??? ? ? ?Obesity Screening on??12/14/21.??Satisfied by Simona Ramos LPN  ?  Diagnostic Results  EKG:?Normal sinus rhythm,?nonspecific T wave changes in inferior leads.? No change from 2019.

## 2023-01-08 PROBLEM — Z00.00 ENCOUNTER FOR WELLNESS EXAMINATION IN ADULT: Status: ACTIVE | Noted: 2023-01-08

## 2023-01-09 PROBLEM — Z28.21 IMMUNIZATION REFUSED: Status: ACTIVE | Noted: 2023-01-09

## 2023-04-10 PROBLEM — Z00.00 ENCOUNTER FOR WELLNESS EXAMINATION IN ADULT: Status: RESOLVED | Noted: 2023-01-08 | Resolved: 2023-04-10

## 2023-07-07 PROBLEM — R73.03 PRE-DIABETES: Status: ACTIVE | Noted: 2023-07-07

## 2023-07-10 PROBLEM — Z71.1 WORRIED WELL: Status: ACTIVE | Noted: 2023-07-10

## 2023-07-10 PROCEDURE — 87389 HIV-1 AG W/HIV-1&-2 AB AG IA: CPT | Performed by: FAMILY MEDICINE

## 2023-07-10 PROCEDURE — 86780 TREPONEMA PALLIDUM: CPT | Performed by: FAMILY MEDICINE

## 2023-07-10 PROCEDURE — 80074 ACUTE HEPATITIS PANEL: CPT | Performed by: FAMILY MEDICINE

## 2023-07-10 PROCEDURE — 87491 CHLMYD TRACH DNA AMP PROBE: CPT | Performed by: FAMILY MEDICINE

## 2023-10-09 PROBLEM — Z00.00 ENCOUNTER FOR WELLNESS EXAMINATION IN ADULT: Status: RESOLVED | Noted: 2023-01-08 | Resolved: 2023-10-09
